# Patient Record
Sex: FEMALE | Race: WHITE | URBAN - METROPOLITAN AREA
[De-identification: names, ages, dates, MRNs, and addresses within clinical notes are randomized per-mention and may not be internally consistent; named-entity substitution may affect disease eponyms.]

---

## 2017-03-11 ENCOUNTER — EMERGENCY (EMERGENCY)
Facility: HOSPITAL | Age: 22
LOS: 1 days | Discharge: HOME | End: 2017-03-11
Attending: EMERGENCY MEDICINE | Admitting: EMERGENCY MEDICINE
Payer: COMMERCIAL

## 2017-03-11 VITALS
SYSTOLIC BLOOD PRESSURE: 130 MMHG | TEMPERATURE: 98 F | WEIGHT: 149.91 LBS | HEART RATE: 84 BPM | HEIGHT: 68 IN | OXYGEN SATURATION: 97 % | RESPIRATION RATE: 18 BRPM | DIASTOLIC BLOOD PRESSURE: 85 MMHG
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
## 2017-03-11 DIAGNOSIS — R68.84 JAW PAIN: ICD-10-CM

## 2017-03-11 DIAGNOSIS — Z92.89 PERSONAL HISTORY OF OTHER MEDICAL TREATMENT: ICD-10-CM

## 2017-03-11 DIAGNOSIS — Z88.1 ALLERGY STATUS TO OTHER ANTIBIOTIC AGENTS STATUS: ICD-10-CM

## 2017-03-11 DIAGNOSIS — S01.81XA LACERATION WITHOUT FOREIGN BODY OF OTHER PART OF HEAD, INITIAL ENCOUNTER: ICD-10-CM
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
## 2017-03-11 PROCEDURE — 99284 EMERGENCY DEPT VISIT MOD MDM: CPT

## 2017-03-11 PROCEDURE — 70486 CT MAXILLOFACIAL W/O DYE: CPT | Mod: 26

## 2017-03-11 PROCEDURE — 99285 EMERGENCY DEPT VISIT HI MDM: CPT | Mod: 25

## 2017-03-11 PROCEDURE — 70486 CT MAXILLOFACIAL W/O DYE: CPT

## 2017-03-11 RX ORDER — CEPHALEXIN 500 MG
1 CAPSULE ORAL
Qty: 10 | Refills: 0 | OUTPATIENT
Start: 2017-03-11 | End: 2017-03-16

## 2017-03-11 RX ORDER — CEPHALEXIN 500 MG
500 CAPSULE ORAL
Qty: 0 | Refills: 0 | Status: DISCONTINUED | OUTPATIENT
Start: 2017-03-11 | End: 2017-03-15

## 2017-03-11 RX ADMIN — Medication 500 MILLIGRAM(S): at 23:00

## 2017-03-11 NOTE — ED PROVIDER NOTE - OBJECTIVE STATEMENT
SC 20 yo otherwise healthy f presents to ed for chin laceration she sustained last night.  pt reports she tripped down last two stairs and landed on her chin.  Pt has since had pain to left jaw and noticed today after shower that laceration might be deep and require sutures. pt reports tetanus vaccine utd. Pt otherwise denies any headaches, dizziness, loc, nausea, vomiting, changes in vision, other musculoskeletal pain/injury/trauma

## 2017-03-11 NOTE — ED PROVIDER NOTE - ENMT, MLM
Airway patent, Nasal mucosa clear. Mouth with normal mucosa. Throat has no vesicles, no oropharyngeal exudates and uvula is midline, + ttp of left jaw, no gross deformities or sts of jaw,  neg malocclusion of jaw

## 2017-03-11 NOTE — ED PROVIDER NOTE - MEDICAL DECISION MAKING DETAILS
SC 22 yo otherwise healthy f presents to ed for chin laceration she sustained last night pt also with left sided jaw pain.  Pt requesting plastics, dr angy zaldivar, did not hear back so paged dr. isidro.  Dr. isidro repaired lac, pt pending ct maxfac to r/o mandibular fractures.

## 2017-03-11 NOTE — ED ADULT NURSE NOTE - CHIEF COMPLAINT QUOTE
Patient came for chin laceration , s/p slipped and fall 2 steps from the stairs last night , denies any loc . No active bleeding noted .

## 2017-03-11 NOTE — ED PROVIDER NOTE - SKIN, MLM
Skin normal color for race, warm, dry and intact. No evidence of rash., superficial 1.5 cm lac to chin,

## 2021-08-25 NOTE — ED ADULT TRIAGE NOTE - CHIEF COMPLAINT QUOTE
Patient came for chin laceration , s/p slipped and fall 2 steps from the stairs last night , denies any loc . No active bleeding noted .
25-Aug-2021